# Patient Record
Sex: MALE | Race: WHITE
[De-identification: names, ages, dates, MRNs, and addresses within clinical notes are randomized per-mention and may not be internally consistent; named-entity substitution may affect disease eponyms.]

---

## 2019-04-14 ENCOUNTER — HOSPITAL ENCOUNTER (EMERGENCY)
Dept: HOSPITAL 91 - FTE | Age: 59
Discharge: HOME | End: 2019-04-14
Payer: SELF-PAY

## 2019-04-14 ENCOUNTER — HOSPITAL ENCOUNTER (EMERGENCY)
Dept: HOSPITAL 10 - FTE | Age: 59
Discharge: HOME | End: 2019-04-14
Payer: SELF-PAY

## 2019-04-14 VITALS
BODY MASS INDEX: 26.58 KG/M2 | BODY MASS INDEX: 26.58 KG/M2 | WEIGHT: 175.38 LBS | HEIGHT: 68 IN | WEIGHT: 175.38 LBS | HEIGHT: 68 IN

## 2019-04-14 VITALS — SYSTOLIC BLOOD PRESSURE: 149 MMHG | HEART RATE: 89 BPM | RESPIRATION RATE: 19 BRPM | DIASTOLIC BLOOD PRESSURE: 91 MMHG

## 2019-04-14 DIAGNOSIS — N49.2: Primary | ICD-10-CM

## 2019-04-14 DIAGNOSIS — E11.9: ICD-10-CM

## 2019-04-14 DIAGNOSIS — Z79.84: ICD-10-CM

## 2019-04-14 LAB — URINE PH (DIP) POC: 5.5 (ref 5–8.5)

## 2019-04-14 PROCEDURE — 81003 URINALYSIS AUTO W/O SCOPE: CPT

## 2019-04-14 PROCEDURE — 99282 EMERGENCY DEPT VISIT SF MDM: CPT

## 2019-04-14 NOTE — ERD
ER Documentation


Chief Complaint


Chief Complaint





CYST REMOVED @ RIGHT UPPER THIGH 5 DAYS AGO; STILL PAINFUL





HPI


This is a 58-year-old male who presents to the emergency room with concern of 


healing incision and drainage procedure to right posterior scrotum.  He is 


concerned that the incision has not healed and is still draining clear fluid.  


Denies fevers, no nausea vomiting diarrhea, pain controlled with ibuprofen.  


Patient is compliant with his doxycycline 100 mg twice daily.  Patient is also 


concerned that his urinary flow has decreased, occasional burning with ur


ination, states occasionally feels a "twinge" at his right kidney.





ROS


All systems reviewed and are negative except as per history of present illness.





Medications


Home Meds


Reported Medications


Atorvastatin Calcium* (Atorvastatin Calcium*) 20 Mg Tablet, 20 MG PO HS, TAB


   9/23/14


Glipizide* (Glucotrol*) 5 Mg Tablet, 10 MG PO BID, TAB


   9/23/14





Allergies


Allergies:  


Coded Allergies:  


     sulfamethoxazole (Verified  Allergy, Unknown, 9/23/14)


     trimethoprim (Verified  Allergy, Unknown, 9/23/14)





PMhx/Soc


History of Surgery:  No


Anesthesia Reaction:  No


Hx Neurological Disorder:  No


Hx Respiratory Disorders:  No


Hx Cardiac Disorders:  No


Hx Psychiatric Problems:  No


Hx Miscellaneous Medical Probl:  Yes (DM, HIGH CHOLESTEROL)


Hx Alcohol Use:  Yes (OCCASSIONAL)


Hx Substance Use:  No


Hx Tobacco Use:  No


Smoking Status:  Unknown if ever smoked





FmHx


Family History:  diabetes





Physical Exam


Vitals





Vital Signs


  Date      Temp  Pulse  Resp  B/P (MAP)   Pulse Ox  O2          O2 Flow    FiO2


Time                                                 Delivery    Rate


   4/14/19  98.5     89    19      149/91        98


     13:54                          (110)





Physical Exam


Const:   No acute distress


Head:   Atraumatic 


Eyes:    Normal Conjunctiva


ENT:    Normal External Ears, Nose and Mouth.


Neck:               Full range of motion. No meningismus.


Resp:   Clear to auscultation bilaterally


Cardio:   Regular rate and rhythm, no murmurs


Abd:    Soft, non tender, non distended. Normal bowel sounds


Skin:   No petechiae or rashes. Right posterior scrotum with small puncture 


wound from prior I/D, +sanguineous drainage, periwound skin pink and intact, no 


induration, normothermic


Back:   No midline or flank tenderness


Ext:    No cyanosis, or edema


Neur:   Awake and alert


Psych:    Normal Mood and Affect


Results 24 hrs





Laboratory Tests


               Test
                                4/14/19
17:03


               Bedside Urine pH (LAB)                        5.5


               Bedside Urine Protein (LAB)          Trace


               Bedside Urine Glucose (UA)                  0.50%


               Bedside Urine Ketones (LAB)          Negative


               Bedside Urine Blood                  Trace-intact


               Bedside Urine Nitrite (LAB)          Negative


               Bedside Urine Leukocyte
Esterase (L  Negative 









Procedures/MDM


This is a 35-year-old female patient who presents to the emergency room with 


complaint of inflammation around outside of the left eye and left neck for 4 


days.





There is low suspicion for infection, abscess, cellulitis.  Patient's primary 


concern was caring for his wound.  Long conversation had with patient regarding 


wound care, sits baths, following up with primary care provider, continuing 


antibiotics.





It was noticed on patient's urinalysis that it was positive for glucose.  


Patient states that he is diabetic and has been working on controlling his blood


sugar.  He currently is unemployed and does not have medical insurance and has 


not been able to afford his glipizide and cannot tolerate metformin.  Patient 


states he will work with his doctor on controlling his blood sugar.  Patient was


instructed to reduce intake of simple carbs and increase physical activity in 


order to help control blood sugar.





Departure


Diagnosis:  


   Primary Impression:  


   Incisional abscess


   Additional Impression:  


   Encounter for wound care


Condition:  Stable


Patient Instructions:  Abscess, Incision And Drainage


Referrals:  


[facility]





Additional Instructions:  


Thank you very much for allowing us to participate in your care. 


Your health and safety is our top priority at [facility].





Call your primary care doctor TOMORROW for an appointment during the next 2-4 


days and bring all the information and medications prescribed. 





Have prescriptions filled and follow precisely the directions on the label. 





If the symptoms get worse and your provider is unavailable, return to the 


Emergency Department immediately.





CONTINUE TO TAKE ANTIBIOTIC- DOXYCYCLINE


INCREASE WATER INTAKE TO AT LEAST 1.5- 2 L/DAY





USE SITZ BATHS AS INSTRUCTED 2-3 TIMES PER DAY





FOLLOW-UP WITH PHYSICIAN WHO PERFORMED PROCEDURE IN 3-5 DAYS











JASON HOLGUIN NP              Apr 14, 2019 16:38